# Patient Record
Sex: MALE | Race: BLACK OR AFRICAN AMERICAN | Employment: UNEMPLOYED | URBAN - METROPOLITAN AREA
[De-identification: names, ages, dates, MRNs, and addresses within clinical notes are randomized per-mention and may not be internally consistent; named-entity substitution may affect disease eponyms.]

---

## 2023-03-24 ENCOUNTER — APPOINTMENT (OUTPATIENT)
Dept: CT IMAGING | Age: 38
End: 2023-03-24

## 2023-03-24 ENCOUNTER — HOSPITAL ENCOUNTER (EMERGENCY)
Age: 38
Discharge: HOME | End: 2023-03-24

## 2023-03-24 ENCOUNTER — APPOINTMENT (OUTPATIENT)
Dept: GENERAL RADIOLOGY | Age: 38
End: 2023-03-24

## 2023-03-24 VITALS
BODY MASS INDEX: 24.49 KG/M2 | OXYGEN SATURATION: 99 % | RESPIRATION RATE: 16 BRPM | SYSTOLIC BLOOD PRESSURE: 140 MMHG | HEIGHT: 78 IN | TEMPERATURE: 97 F | WEIGHT: 211.64 LBS | DIASTOLIC BLOOD PRESSURE: 86 MMHG | HEART RATE: 76 BPM

## 2023-03-24 DIAGNOSIS — R07.89 CHEST WALL PAIN: Primary | ICD-10-CM

## 2023-03-24 LAB
ANION GAP SERPL CALCULATED.3IONS-SCNC: 8 MMOL/L (ref 3–16)
BASOPHILS # BLD: 0.1 K/UL (ref 0–0.2)
BASOPHILS NFR BLD: 1.8 %
BUN SERPL-MCNC: 6 MG/DL (ref 7–20)
CALCIUM SERPL-MCNC: 9.5 MG/DL (ref 8.3–10.6)
CHLORIDE SERPL-SCNC: 101 MMOL/L (ref 99–110)
CO2 SERPL-SCNC: 25 MMOL/L (ref 21–32)
CREAT SERPL-MCNC: 1 MG/DL (ref 0.9–1.3)
D DIMER: 1.56 UG/ML FEU (ref 0–0.6)
DEPRECATED RDW RBC AUTO: 12.4 % (ref 12.4–15.4)
EOSINOPHIL # BLD: 0.1 K/UL (ref 0–0.6)
EOSINOPHIL NFR BLD: 1.4 %
GFR SERPLBLD CREATININE-BSD FMLA CKD-EPI: >60 ML/MIN/{1.73_M2}
GLUCOSE SERPL-MCNC: 85 MG/DL (ref 70–99)
HCT VFR BLD AUTO: 47.6 % (ref 40.5–52.5)
HGB BLD-MCNC: 16.2 G/DL (ref 13.5–17.5)
LYMPHOCYTES # BLD: 2 K/UL (ref 1–5.1)
LYMPHOCYTES NFR BLD: 41.7 %
MCH RBC QN AUTO: 30.5 PG (ref 26–34)
MCHC RBC AUTO-ENTMCNC: 34 G/DL (ref 31–36)
MCV RBC AUTO: 89.7 FL (ref 80–100)
MONOCYTES # BLD: 0.6 K/UL (ref 0–1.3)
MONOCYTES NFR BLD: 12.8 %
NEUTROPHILS # BLD: 2 K/UL (ref 1.7–7.7)
NEUTROPHILS NFR BLD: 42.3 %
PLATELET # BLD AUTO: 182 K/UL (ref 135–450)
PMV BLD AUTO: 7.3 FL (ref 5–10.5)
POTASSIUM SERPL-SCNC: 5.2 MMOL/L (ref 3.5–5.1)
RBC # BLD AUTO: 5.31 M/UL (ref 4.2–5.9)
SODIUM SERPL-SCNC: 134 MMOL/L (ref 136–145)
TROPONIN T SERPL-MCNC: <0.01 NG/ML
WBC # BLD AUTO: 4.7 K/UL (ref 4–11)

## 2023-03-24 PROCEDURE — 71046 X-RAY EXAM CHEST 2 VIEWS: CPT

## 2023-03-24 PROCEDURE — 85025 COMPLETE CBC W/AUTO DIFF WBC: CPT

## 2023-03-24 PROCEDURE — 80048 BASIC METABOLIC PNL TOTAL CA: CPT

## 2023-03-24 PROCEDURE — 6370000000 HC RX 637 (ALT 250 FOR IP): Performed by: NURSE PRACTITIONER

## 2023-03-24 PROCEDURE — 93005 ELECTROCARDIOGRAM TRACING: CPT | Performed by: PHYSICIAN ASSISTANT

## 2023-03-24 PROCEDURE — 84484 ASSAY OF TROPONIN QUANT: CPT

## 2023-03-24 PROCEDURE — 71260 CT THORAX DX C+: CPT | Performed by: NURSE PRACTITIONER

## 2023-03-24 PROCEDURE — 99285 EMERGENCY DEPT VISIT HI MDM: CPT

## 2023-03-24 PROCEDURE — 6360000004 HC RX CONTRAST MEDICATION: Performed by: NURSE PRACTITIONER

## 2023-03-24 PROCEDURE — 85379 FIBRIN DEGRADATION QUANT: CPT

## 2023-03-24 RX ORDER — ACETAMINOPHEN 500 MG
500 TABLET ORAL 4 TIMES DAILY PRN
Qty: 28 TABLET | Refills: 0 | Status: SHIPPED | OUTPATIENT
Start: 2023-03-24 | End: 2023-03-31

## 2023-03-24 RX ORDER — LIDOCAINE 4 G/G
1 PATCH TOPICAL ONCE
Status: DISCONTINUED | OUTPATIENT
Start: 2023-03-24 | End: 2023-03-24 | Stop reason: HOSPADM

## 2023-03-24 RX ORDER — LIDOCAINE 50 MG/G
1 PATCH TOPICAL DAILY
Qty: 10 PATCH | Refills: 0 | Status: SHIPPED | OUTPATIENT
Start: 2023-03-24 | End: 2023-04-03

## 2023-03-24 RX ORDER — IBUPROFEN 400 MG/1
400 TABLET ORAL ONCE
Status: DISCONTINUED | OUTPATIENT
Start: 2023-03-24 | End: 2023-03-24

## 2023-03-24 RX ORDER — ASPIRIN 81 MG/1
324 TABLET, CHEWABLE ORAL ONCE
Status: COMPLETED | OUTPATIENT
Start: 2023-03-24 | End: 2023-03-24

## 2023-03-24 RX ORDER — IBUPROFEN 600 MG/1
600 TABLET ORAL 3 TIMES DAILY PRN
Qty: 15 TABLET | Refills: 0 | Status: SHIPPED | OUTPATIENT
Start: 2023-03-24 | End: 2023-03-29

## 2023-03-24 RX ORDER — ACETAMINOPHEN 500 MG
1000 TABLET ORAL ONCE
Status: COMPLETED | OUTPATIENT
Start: 2023-03-24 | End: 2023-03-24

## 2023-03-24 RX ADMIN — ASPIRIN 81 MG 324 MG: 81 TABLET ORAL at 14:20

## 2023-03-24 RX ADMIN — IOPAMIDOL 75 ML: 755 INJECTION, SOLUTION INTRAVENOUS at 15:18

## 2023-03-24 RX ADMIN — ACETAMINOPHEN 1000 MG: 500 TABLET ORAL at 14:19

## 2023-03-24 ASSESSMENT — PAIN DESCRIPTION - PAIN TYPE: TYPE: ACUTE PAIN

## 2023-03-24 ASSESSMENT — PAIN DESCRIPTION - LOCATION
LOCATION: HIP
LOCATION: CHEST

## 2023-03-24 ASSESSMENT — PAIN DESCRIPTION - FREQUENCY: FREQUENCY: INTERMITTENT

## 2023-03-24 ASSESSMENT — PAIN - FUNCTIONAL ASSESSMENT: PAIN_FUNCTIONAL_ASSESSMENT: 0-10

## 2023-03-24 ASSESSMENT — PAIN SCALES - GENERAL: PAINLEVEL_OUTOF10: 5

## 2023-03-24 ASSESSMENT — HEART SCORE: ECG: 0

## 2023-03-24 ASSESSMENT — PAIN DESCRIPTION - ORIENTATION: ORIENTATION: RIGHT

## 2023-03-24 NOTE — DISCHARGE INSTRUCTIONS
Medications as prescribed. Ensure that you eat prior to taking ibuprofen as this is an NSAID medication that can otherwise cause gastrointestinal bleeding/stomach ulcers if taken to the stomach. Follow-up with the physician referral service line or to establish a PCP for your future nonemergent medical needs. Your work-up here today shows no acute abnormalities. No elevation in your cardiac specific enzyme, no pneumonia, collapsed lung, blood clot in your lung, or other acute findings. Likely etiology of your discomfort is musculoskeletal.    Utilize ice and heat as well as medications as prescribed. Follow-up with the physician referral service line or to establish a PCP for your future nonemergent medical needs.

## 2023-03-24 NOTE — ED NOTES
.Pt discharged at this time. Discharge instructions and medications reviewed,  Questions were answered. PT verbalized understanding. Follow up appointments were discussed.          [facility]  03/24/23 9350

## 2023-03-24 NOTE — ED PROVIDER NOTES
Pt Name: Shonna Araiza  MRN: [id number]  [place] 1985  Date of evaluation: 3/24/2023    EKG Interpretation    The purpose of this note is for preliminary EKG interpretation only. This patient was not seen by this provider. EKG visualized preliminarily interpreted by myself shows sinus rhythm at a rate of 69. Normal axis of 49. Nonspecific T wave findings in 3 and aVF. No signs of acute injury or acute ischemia.   Intervals are normal.        Janelle Magana MD  03/24/23 6950

## 2023-03-24 NOTE — ED TRIAGE NOTES
Pt arrives and ambulatory for eval of chest pain when breathing, denies cough. Pt sts [place]ut [place] country [place] from [place]. Pt denies n/v, denies cardiac hx. Pt also complaining of right hip pain intermittent and chronic. Pt is a/xo4, rsp nonlabored and pwd.

## 2023-03-26 ASSESSMENT — ENCOUNTER SYMPTOMS
NAUSEA: 0
ANAL BLEEDING: 0
VOMITING: 0
EYE PAIN: 0
SORE THROAT: 0
ABDOMINAL PAIN: 0
BACK PAIN: 0
SHORTNESS OF BREATH: 1
COUGH: 0

## 2023-03-27 LAB
EKG ATRIAL RATE: 69 BPM
EKG DIAGNOSIS: NORMAL
EKG P AXIS: 73 DEGREES
EKG P-R INTERVAL: 140 MS
EKG Q-T INTERVAL: 360 MS
EKG QRS DURATION: 82 MS
EKG QTC CALCULATION (BAZETT): 385 MS
EKG R AXIS: 49 DEGREES
EKG T AXIS: 3 DEGREES
EKG VENTRICULAR RATE: 69 BPM

## 2023-03-27 PROCEDURE — 93010 ELECTROCARDIOGRAM REPORT: CPT | Performed by: INTERNAL MEDICINE

## 2023-03-27 NOTE — ED PROVIDER NOTES
Respiratory:  Positive for shortness of breath. Negative for cough. Cardiovascular:  Positive for chest pain. Negative for leg swelling. Gastrointestinal:  Negative for abdominal pain, anal bleeding, nausea and vomiting. Genitourinary:  Negative for difficulty urinating, dysuria, frequency and urgency. Musculoskeletal:  Negative for back pain and neck pain. Skin:  Negative for rash and wound. Neurological:  Positive for light-headedness. Negative for dizziness. Positives and Pertinent negatives as per HPI. SURGICAL HISTORY   History reviewed. No pertinent surgical history. [id number]       Discharge Medication List as of 3/24/2023  4:39 PM          ALLERGIES     Sulfa antibiotics    FAMILYHISTORY     History reviewed. No pertinent family history. SOCIAL HISTORY       Social History     Tobacco Use    Smoking status: Never    Smokeless tobacco: Never   Vaping Use    Vaping Use: Never used   Substance Use Topics    Alcohol use: Yes    Drug use: Yes     Types: Marijuana (Weed)       SCREENINGS        Saul Coma Scale  Eye Opening: Spontaneous  Best Verbal Response: Oriented  Best Motor Response: Obeys commands  [place] Coma Scale Score: 15        Heart Score for chest pain patients  History: Slightly Suspicious  ECG: Normal  Patient Age: < 45 years  Risk Factors: No risk factors known  Troponin: < 1X normal limit  Heart Score Total: 0       CIWA Assessment  BP: (!) 140/86  Heart Rate: 76           PHYSICAL EXAM  1 or more Elements     ED Triage Vitals [03/24/23 1318]   BP Temp Temp Source Heart Rate Resp SpO2 Height Weight   (!) 140/86 97 °F (36.1 °C) Oral 76 16 99 % 6' 6\" (1.981 m) 211 lb 10.3 oz (96 kg)       Physical Exam  Vitals and nursing note reviewed. Constitutional:       Appearance: Normal appearance. He is not diaphoretic. HENT:      Head: Normocephalic and atraumatic.       Right Ear: External ear normal.      Left Ear: External ear normal.      Nose: Nose normal. Mouth/Throat:      Mouth: Mucous membranes are moist.   Eyes:      General:         Right eye: No discharge. Left eye: No discharge. Extraocular Movements: Extraocular movements intact. Cardiovascular:      Rate and Rhythm: Normal rate and regular rhythm. Heart sounds: No murmur heard. No friction rub. No gallop. Pulmonary:      Effort: Pulmonary effort is normal. No respiratory distress. Breath sounds: No wheezing, rhonchi or rales. Abdominal:      Palpations: Abdomen is soft. Tenderness: There is no abdominal tenderness. There is no right CVA tenderness, left CVA tenderness, guarding or rebound. Musculoskeletal:         General: Normal range of motion. Arms:       Cervical back: Normal range of motion and neck supple. Right lower leg: No edema. Left lower leg: No edema. Comments: Negative Homans sign bilaterally   Skin:     General: Skin is warm and dry. Capillary Refill: Capillary refill takes less than 2 seconds. Neurological:      Mental Status: He is alert and oriented to person, place, and time.    Psychiatric:         Mood and Affect: Mood normal.         Behavior: Behavior normal.         DIAGNOSTIC RESULTS   LABS:    I have reviewed and interpreted all of the currently available lab results from this visit:  Results for orders placed or performed during the hospital encounter of 69/36/77   Basic Metabolic Panel   Result Value Ref Range    Sodium 134 (L) 136 - 145 mmol/L    Potassium 5.2 (H) 3.5 - 5.1 mmol/L    Chloride 101 99 - 110 mmol/L    CO2 25 21 - 32 mmol/L    Anion Gap 8 3 - 16    Glucose 85 70 - 99 mg/dL    BUN 6 (L) 7 - 20 mg/dL    Creatinine 1.0 0.9 - 1.3 mg/dL    Est, Glom Filt Rate >60 >60    Calcium 9.5 8.3 - 10.6 mg/dL   Troponin   Result Value Ref Range    Troponin <0.01 <0.01 ng/mL   CBC with Auto Differential   Result Value Ref Range    WBC 4.7 4.0 - 11.0 K/uL    RBC 5.31 4.20 - 5.90 M/uL    Hemoglobin 16.2 13.5 - 17.5

## 2024-04-06 ENCOUNTER — HOSPITAL ENCOUNTER (EMERGENCY)
Age: 39
Discharge: HOME | End: 2024-04-06
Payer: COMMERCIAL

## 2024-04-06 ENCOUNTER — APPOINTMENT (OUTPATIENT)
Dept: GENERAL RADIOLOGY | Age: 39
End: 2024-04-06
Payer: COMMERCIAL

## 2024-04-06 VITALS
WEIGHT: 208 LBS | DIASTOLIC BLOOD PRESSURE: 91 MMHG | OXYGEN SATURATION: 100 % | TEMPERATURE: 98.4 F | RESPIRATION RATE: 18 BRPM | SYSTOLIC BLOOD PRESSURE: 150 MMHG | BODY MASS INDEX: 24.04 KG/M2 | HEART RATE: 89 BPM

## 2024-04-06 DIAGNOSIS — M25.552 LEFT HIP PAIN: Primary | ICD-10-CM

## 2024-04-06 PROCEDURE — 96372 THER/PROPH/DIAG INJ SC/IM: CPT

## 2024-04-06 PROCEDURE — 73502 X-RAY EXAM HIP UNI 2-3 VIEWS: CPT

## 2024-04-06 PROCEDURE — 6360000002 HC RX W HCPCS: Performed by: PHYSICIAN ASSISTANT

## 2024-04-06 PROCEDURE — 99283 EMERGENCY DEPT VISIT LOW MDM: CPT

## 2024-04-06 RX ORDER — KETOROLAC TROMETHAMINE 30 MG/ML
30 INJECTION, SOLUTION INTRAMUSCULAR; INTRAVENOUS ONCE
Status: COMPLETED | OUTPATIENT
Start: 2024-04-06 | End: 2024-04-06

## 2024-04-06 RX ORDER — DICLOFENAC SODIUM 75 MG/1
75 TABLET, DELAYED RELEASE ORAL 2 TIMES DAILY PRN
Qty: 20 TABLET | Refills: 0 | Status: SHIPPED | OUTPATIENT
Start: 2024-04-06

## 2024-04-06 RX ADMIN — KETOROLAC TROMETHAMINE 30 MG: 30 INJECTION, SOLUTION INTRAMUSCULAR at 14:38

## 2024-04-06 ASSESSMENT — PAIN DESCRIPTION - FREQUENCY: FREQUENCY: CONTINUOUS

## 2024-04-06 ASSESSMENT — PAIN DESCRIPTION - PAIN TYPE: TYPE: ACUTE PAIN

## 2024-04-06 ASSESSMENT — PAIN - FUNCTIONAL ASSESSMENT: PAIN_FUNCTIONAL_ASSESSMENT: 0-10

## 2024-04-06 ASSESSMENT — PAIN SCALES - GENERAL: PAINLEVEL_OUTOF10: 9

## 2024-04-06 ASSESSMENT — LIFESTYLE VARIABLES: HOW OFTEN DO YOU HAVE A DRINK CONTAINING ALCOHOL: 2-4 TIMES A MONTH

## 2024-04-06 ASSESSMENT — PAIN DESCRIPTION - LOCATION: LOCATION: HIP

## 2024-04-06 ASSESSMENT — PAIN DESCRIPTION - ORIENTATION: ORIENTATION: LEFT

## 2024-04-06 NOTE — ED PROVIDER NOTES
Reflexes normal.   Psychiatric:         Mood and Affect: Mood normal.         Behavior: Behavior normal.             DIAGNOSTIC RESULTS   LABS:    Labs Reviewed - No data to display    When ordered only abnormal lab results are displayed. All other labs were within normal range or not returned as of this dictation.    EKG: When ordered, EKG's are interpreted by the Emergency Department Physician in the absence of a cardiologist.  Please see their note for interpretation of EKG.    RADIOLOGY:   Non-plain film images such as CT, Ultrasound and MRI are read by the radiologist. Plain radiographic images are visualized and preliminarily interpreted by the ED Provider with the below findings:        Interpretation per the Radiologist below, if available at the time of this note:    XR HIP 2-3 VW W PELVIS LEFT   Final Result   No acute osseous abnormality.           No results found.    No results found.    PROCEDURES   Unless otherwise noted below, none     Procedures    CRITICAL CARE TIME (.cctime)         EMERGENCY DEPARTMENT COURSE and DIFFERENTIAL DIAGNOSIS/MDM:   Vitals:    Vitals:    04/06/24 1426   BP: (!) 150/91   Pulse: 89   Resp: 18   Temp: 98.4 °F (36.9 °C)   TempSrc: Oral   SpO2: 100%   Weight: 94.3 kg (208 lb)       Patient was given the following medications:  Medications   ketorolac (TORADOL) injection 30 mg (30 mg IntraMUSCular Given 4/6/24 1438)             Is this patient to be included in the SEP-1 Core Measure due to severe sepsis or septic shock?   No   Exclusion criteria - the patient is NOT to be included for SEP-1 Core Measure due to:  Infection is not suspected    Chronic Conditions affecting care:    has no past medical history on file.    CONSULTS: (Who and What was discussed)  None      Social Determinants : None    Records Reviewed (Source):     CC/HPI Summary, DDx, ED Course, and Reassessment:   Zeeshan LUCERO Obi is a 38 y.o. male who presents complaining of left hip pain after lifting at the

## 2024-04-08 ENCOUNTER — TELEPHONE (OUTPATIENT)
Dept: ORTHOPEDIC SURGERY | Age: 39
End: 2024-04-08

## 2024-04-08 NOTE — TELEPHONE ENCOUNTER
Left message for patient to return call if they would like to schedule a follow up appointment.Upon return call please schedule appt with Dr. Darden

## 2024-04-10 ENCOUNTER — OFFICE VISIT (OUTPATIENT)
Dept: ORTHOPEDIC SURGERY | Age: 39
End: 2024-04-10
Payer: COMMERCIAL

## 2024-04-10 VITALS — BODY MASS INDEX: 24.07 KG/M2 | HEIGHT: 78 IN | WEIGHT: 208 LBS

## 2024-04-10 DIAGNOSIS — N50.812 PAIN IN LEFT TESTICLE: ICD-10-CM

## 2024-04-10 DIAGNOSIS — S76.012A MUSCLE STRAIN OF LEFT GLUTEAL REGION, INITIAL ENCOUNTER: Primary | ICD-10-CM

## 2024-04-10 PROCEDURE — 99203 OFFICE O/P NEW LOW 30 MIN: CPT | Performed by: PHYSICIAN ASSISTANT

## 2024-04-10 NOTE — PROGRESS NOTES
(TYLENOL) 500 MG tablet Take 1 tablet by mouth 4 times daily as needed for Pain 28 tablet 0     No current facility-administered medications for this visit.       History reviewed. No pertinent past medical history.     History reviewed. No pertinent surgical history.    History reviewed. No pertinent family history.    Social History     Socioeconomic History    Marital status:      Spouse name: Not on file    Number of children: Not on file    Years of education: Not on file    Highest education level: Not on file   Occupational History    Not on file   Tobacco Use    Smoking status: Never    Smokeless tobacco: Never   Vaping Use    Vaping Use: Never used   Substance and Sexual Activity    Alcohol use: Yes    Drug use: Yes     Types: Marijuana (Weed)    Sexual activity: Not on file   Other Topics Concern    Not on file   Social History Narrative    Not on file     Social Determinants of Health     Financial Resource Strain: Not on file   Food Insecurity: Not on file   Transportation Needs: Not on file   Physical Activity: Not on file   Stress: Not on file   Social Connections: Not on file   Intimate Partner Violence: Not on file   Housing Stability: Not on file        Vitals:    04/10/24 1434   Weight: 94.3 kg (208 lb)   Height: 1.981 m (6' 6\")       Physical Exam  Constitutional - well-groomed, well-nourished, Body mass index is 24.04 kg/m²., normal body habitus  Psychiatric - pleasant, normal mood & affect   Cardiovascular - RRR, negative peripheral edema, dorsalis pedis pulse 2+  Respiratory - respirations unlabored, on room air  Skin - no rashes, wounds, or lesions seen on exposed skin  Neurological - LLE SILT SP/DP/T/sural/saphenous nerve distributions; EHL/FHL/TA/GS intact  Back/Left hip:  Can bear weight on the extremity and walks with a(n) normal gait.   Skin is intact, no scars or atrophy.   No tenderness to palpation anywhere about the hip or lower back.   Full, painless range of motion of